# Patient Record
Sex: MALE | Race: BLACK OR AFRICAN AMERICAN | NOT HISPANIC OR LATINO | Employment: UNEMPLOYED | URBAN - METROPOLITAN AREA
[De-identification: names, ages, dates, MRNs, and addresses within clinical notes are randomized per-mention and may not be internally consistent; named-entity substitution may affect disease eponyms.]

---

## 2020-06-17 PROBLEM — J18.9 PNEUMONIA: Status: ACTIVE | Noted: 2020-06-17

## 2020-06-17 PROBLEM — U07.1 COVID-19 VIRUS DETECTED: Status: ACTIVE | Noted: 2020-06-17

## 2020-07-06 ENCOUNTER — NURSE TRIAGE (OUTPATIENT)
Dept: ADMINISTRATIVE | Facility: CLINIC | Age: 50
End: 2020-07-06

## 2020-07-06 NOTE — TELEPHONE ENCOUNTER
Pt states he was admitted to hospital for COVID 19. Pt states he needs repeat COVID 19 test to return to work. Pt given information regarding community testing sites and phone numbers. Pt verbalizes understanding.     Reason for Disposition   Health Information question, no triage required and triager able to answer question    Additional Information   Negative: RN needs further essential information from caller in order to complete triage   Negative: Requesting regular office appointment   Negative: [1] Caller requesting NON-URGENT health information AND [2] PCP's office is the best resource    Protocols used: INFORMATION ONLY CALL-A-

## 2025-01-07 PROBLEM — S82.62XA CLOSED LOW LATERAL MALLEOLUS FRACTURE, LEFT, INITIAL ENCOUNTER: Status: ACTIVE | Noted: 2025-01-07

## 2025-01-07 PROBLEM — M25.572 ACUTE LEFT ANKLE PAIN: Status: ACTIVE | Noted: 2025-01-07
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
## 2025-01-07 PROBLEM — I15.9 SECONDARY HYPERTENSION: Status: ACTIVE | Noted: 2025-01-07

## 2025-01-10 PROBLEM — J18.9 PNEUMONIA: Status: RESOLVED | Noted: 2020-06-17 | Resolved: 2025-01-10

## 2025-01-10 PROBLEM — I10 PRIMARY HYPERTENSION: Chronic | Status: ACTIVE | Noted: 2025-01-07

## 2025-01-10 PROBLEM — Z98.890 STATUS POST SURGERY: Status: ACTIVE | Noted: 2025-01-10

## 2025-01-10 PROBLEM — U07.1 COVID-19 VIRUS DETECTED: Status: RESOLVED | Noted: 2020-06-17 | Resolved: 2025-01-10

## 2025-01-10 PROBLEM — S82.842D BIMALLEOLAR ANKLE FRACTURE, LEFT, CLOSED, WITH ROUTINE HEALING, SUBSEQUENT ENCOUNTER: Status: ACTIVE | Noted: 2025-01-07

## 2025-06-24 ENCOUNTER — PATIENT OUTREACH (OUTPATIENT)
Dept: ADMINISTRATIVE | Facility: HOSPITAL | Age: 55
End: 2025-06-24